# Patient Record
Sex: FEMALE | Race: WHITE | NOT HISPANIC OR LATINO | Employment: FULL TIME | URBAN - METROPOLITAN AREA
[De-identification: names, ages, dates, MRNs, and addresses within clinical notes are randomized per-mention and may not be internally consistent; named-entity substitution may affect disease eponyms.]

---

## 2018-02-26 ENCOUNTER — HOSPITAL ENCOUNTER (EMERGENCY)
Facility: HOSPITAL | Age: 28
Discharge: HOME | End: 2018-02-26
Attending: EMERGENCY MEDICINE
Payer: MEDICAID

## 2018-02-26 VITALS
WEIGHT: 182.63 LBS | HEART RATE: 80 BPM | BODY MASS INDEX: 27.05 KG/M2 | SYSTOLIC BLOOD PRESSURE: 124 MMHG | OXYGEN SATURATION: 98 % | TEMPERATURE: 98 F | DIASTOLIC BLOOD PRESSURE: 78 MMHG | HEIGHT: 69 IN | RESPIRATION RATE: 16 BRPM

## 2018-02-26 DIAGNOSIS — S96.912A STRAIN OF LEFT ANKLE, INITIAL ENCOUNTER: Primary | ICD-10-CM

## 2018-02-26 DIAGNOSIS — M79.672 LEFT FOOT PAIN: ICD-10-CM

## 2018-02-26 DIAGNOSIS — S99.929A FOOT INJURY: ICD-10-CM

## 2018-02-26 PROCEDURE — 99283 EMERGENCY DEPT VISIT LOW MDM: CPT

## 2018-02-26 PROCEDURE — 25000003 PHARM REV CODE 250: Performed by: EMERGENCY MEDICINE

## 2018-02-26 RX ORDER — NAPROXEN 500 MG/1
500 TABLET ORAL
Status: COMPLETED | OUTPATIENT
Start: 2018-02-26 | End: 2018-02-26

## 2018-02-26 RX ORDER — NAPROXEN 500 MG/1
500 TABLET ORAL 2 TIMES DAILY WITH MEALS
Qty: 20 TABLET | Refills: 0 | Status: SHIPPED | OUTPATIENT
Start: 2018-02-26 | End: 2020-01-04

## 2018-02-26 RX ADMIN — NAPROXEN 500 MG: 500 TABLET ORAL at 09:02

## 2018-02-27 NOTE — ED PROVIDER NOTES
"   History      Chief Complaint   Patient presents with    Foot Injury     Patient reports left foot pain post horseplaying.       Review of patient's allergies indicates:  No Known Allergies     HPI   HPI    2/26/2018, 9:20 PM   History obtained from the patient      History of Present Illness: Zuleyka Pop is a 27 y.o. female patient who presents to the Emergency Department for L foot pain which onset suddenly while "wrestling" tonight. Symptoms are constant and moderate in severity. No mitigating or exacerbating factors reported. Associated sxs include pain with walking. Patient denies any other injuries, and all other sxs at this time. Prior Tx includes nothing. No further complaints or concerns at this time.         Arrival mode: Personal vehicle      PCP: Primary Doctor No       Past Medical History:  History reviewed. No pertinent past medical history.    Past Surgical History:  Past Surgical History:   Procedure Laterality Date    TUBAL LIGATION           Family History:  History reviewed. No pertinent family history.    Social History:  Social History     Social History Main Topics    Smoking status: Current Every Day Smoker     Packs/day: 1.00     Types: Cigarettes    Smokeless tobacco: Never Used    Alcohol use No    Drug use: No    Sexual activity: Yes     Partners: Male       ROS   Review of Systems   Constitutional: Negative for fever.   HENT: Negative for sore throat.    Respiratory: Negative for shortness of breath.    Cardiovascular: Negative for chest pain.   Gastrointestinal: Negative for nausea.   Genitourinary: Negative for dysuria.   Musculoskeletal: Positive for arthralgias and gait problem. Negative for back pain.   Skin: Negative for rash.   Neurological: Negative for weakness.   Hematological: Does not bruise/bleed easily.   All other systems reviewed and are negative.      Physical Exam      Initial Vitals [02/26/18 2108]   BP Pulse Resp Temp SpO2   133/71 80 18 98 °F (36.7 °C) " "100 %      MAP       91.67          Physical Exam  Nursing Notes and Vital Signs Reviewed.  Constitutional: Patient is in no acute distress. Well-developed and well-nourished.  Head: Atraumatic. Normocephalic.  Eyes: PERRL. EOM intact. Conjunctivae are not pale. No scleral icterus.  ENT: Mucous membranes are moist. Oropharynx is clear and symmetric.    Neck: Supple. Full ROM. No lymphadenopathy.  Cardiovascular: Regular rate. Regular rhythm. No murmurs, rubs, or gallops. Distal pulses are 2+ and symmetric.  Pulmonary/Chest: No respiratory distress. Clear to auscultation bilaterally. No wheezing or rales.  Abdominal: Soft and non-distended.  There is no tenderness.  No rebound, guarding, or rigidity. Good bowel sounds.  Genitourinary: No CVA tenderness  Musculoskeletal: Moves all extremities. No obvious deformities. No edema. No calf tenderness. Tenderness to dorsum of L midfoot, no obvious swelling noted   Skin: Warm and dry.  Neurological:  Alert, awake, and appropriate.  Normal speech.  No acute focal neurological deficits are appreciated.  Psychiatric: Normal affect. Good eye contact. Appropriate in content.    ED Course    Procedures  ED Vital Signs:  Vitals:    02/26/18 2108 02/26/18 2214   BP: 133/71 124/78   Pulse: 80 80   Resp: 18 16   Temp: 98 °F (36.7 °C) 98 °F (36.7 °C)   TempSrc: Oral Oral   SpO2: 100% 98%   Weight: 82.8 kg (182 lb 9.6 oz)    Height: 5' 9" (1.753 m)        Abnormal Lab Results:  Labs Reviewed - No data to display     All Lab Results:      Imaging Results:  Imaging Results          X-Ray Ankle Complete Left (Final result)  Result time 02/26/18 21:57:58    Final result by Laurel Mario MD (02/26/18 21:57:58)                 Impression:      Normal exam.      Electronically signed by: LAUREL MARIO MD  Date:     02/26/18  Time:    21:57              Narrative:    EXAM: XR ANKLE COMPLETE 3 VIEW LEFT    CLINICAL HISTORY:  Left ankle pain    COMPARISON EXAMS: none    FINDINGS:  Negative for " "fracture or dislocation.  No significant arthritic changes.  No bony abnormalities.                             X-Ray Foot Complete Left (Final result)  Result time 02/26/18 21:57:21    Final result by Laurel Mario MD (02/26/18 21:57:21)                 Impression:      No acute findings.      Electronically signed by: LAUREL MARIO MD  Date:     02/26/18  Time:    21:57              Narrative:    EXAM: XR FOOT COMPLETE 3 VIEW LEFT    CLINICAL HISTORY:  S99.929A Unspecified injury of unspecified foot, initial encounter;    COMPARISON EXAMS: none    FINDINGS:  Negative for fracture or dislocation.  No significant arthritic changes.  Small sclerotic focus in the cuboid statistically a small bone island.                               Vitals:    02/26/18 2108 02/26/18 2214   BP: 133/71 124/78   Pulse: 80 80   Resp: 18 16   Temp: 98 °F (36.7 °C) 98 °F (36.7 °C)   TempSrc: Oral Oral   SpO2: 100% 98%   Weight: 82.8 kg (182 lb 9.6 oz)    Height: 5' 9" (1.753 m)        No results found for this or any previous visit.      Imaging Results          X-Ray Ankle Complete Left (Final result)  Result time 02/26/18 21:57:58    Final result by Laurel Mario MD (02/26/18 21:57:58)                 Impression:      Normal exam.      Electronically signed by: LAUREL MARIO MD  Date:     02/26/18  Time:    21:57              Narrative:    EXAM: XR ANKLE COMPLETE 3 VIEW LEFT    CLINICAL HISTORY:  Left ankle pain    COMPARISON EXAMS: none    FINDINGS:  Negative for fracture or dislocation.  No significant arthritic changes.  No bony abnormalities.                             X-Ray Foot Complete Left (Final result)  Result time 02/26/18 21:57:21    Final result by Laurel Mario MD (02/26/18 21:57:21)                 Impression:      No acute findings.      Electronically signed by: LAUREL MARIO MD  Date:     02/26/18  Time:    21:57              Narrative:    EXAM: XR FOOT COMPLETE 3 VIEW LEFT    CLINICAL HISTORY:  S99.929A Unspecified " injury of unspecified foot, initial encounter;    COMPARISON EXAMS: none    FINDINGS:  Negative for fracture or dislocation.  No significant arthritic changes.  Small sclerotic focus in the cuboid statistically a small bone island.                              Medications   naproxen tablet 500 mg (500 mg Oral Given 2/26/18 2144)     9:30 PM - Transfer of Care: Patient care transferred from Remy Hammer to , pending xray results.      9:36 PM  - Re-evaluation:  The patient is resting comfortably and is in no acute distress.  Agree with Remy Hammer's assessment.  Pt's states her pain is in proximal metatarsal/ATFL area.  Skin intact Discussed test results and notified of pending labs. Answered questions at this time.     10:07 PM - Re-evaluation: The patient is resting comfortably and is in no acute distress. She states that her symptoms have improved after treatment within ER. Discussed test results, shared treatment plan, specific conditions for return, and importance of follow up with patient and family.  She understands and agrees with the plan as discussed. Answered  her questions at this time. She has remained hemodynamically stable throughout the ED course and is appropriate for discharge home.     Regarding ANKLE PAIN, for treatment, patient instructed to: rest ankle for several days without applying weight on ankle; use an ACE bandage or ankle brace; consider using crutches or a cane to help take the weight off a sore or unsteady ankle; keep foot/ankle elevated; apply ice to area immediately and for 10-15 minutes every hour for the first day, then, apply ice every 3-4 hours for 2 more days; and try over-the-counter Tylenol or Ibuprofen for pain and swelling. Patient advised to notify primary care provider or return to ED if swelling does not decrease within 2-3 days or notice signs or symptoms of infection (redness, increased pain, fever, and warmth around ankle); or if they notice a popping  sound and have immediate trouble using or moving ankle. For prevention, patient advised to obtain/maintain healthy weight; warm up before exercising; avoid sports and activities in which proper conditioning has not been achieved; ensure that shoes fit properly; use ankle support braces, work on balance, and do agility exercises.    Regarding CONTUSIONS/STRAINS, I advised patient to: REST the injured area or use it less than usual; apply ICE to decrease swelling and pain and help prevent tissue damage; use COMPRESSION with an elastic bandage to support the area and decrease swelling; ELEVATE injured body part above the level of the heart to help decrease pain and swelling; AVOID using massage or massage to acute injuries as it may slow healing of the area; AVOID drinking alcohol as it may slow healing of the injury; and avoid stretching injured muscles. Advised patient to return to the emergency department or contact primary care provider if: having trouble moving injured area; notice tingling or numbness in or near the injured area; extremity below the bruise gets cold or turns pale; a new lump develops in the injured area; symptoms do not improve with treatment after 4 to 5 days; there is any questions or concerns about the condition or treatment plan.     Pre-hypertension/Hypertension: The pt has been informed that they may have pre-hypertension or hypertension based on a blood pressure reading in the ED. I recommend that the pt call the PCP listed on their discharge instructions or a physician of their choice this week to arrange f/u for further evaluation of possible pre-hypertension or hypertension.     Zuleyka Pop was given a handout which discussed their disease process, precautions, and instructions for follow-up and therapy.    Follow-up Information     [facility]. Schedule an appointment as soon as possible for a visit in 1 week.    Contact information:  [address] [facility]  [place]  [place] [address]  [phone]             [facility] Medicine. Schedule an appointment as soon as possible for a visit in 1 week.    Specialty:  Internal Medicine  Contact information:  [address] [id number] [phone]           [facility].    Specialty:  Emergency Medicine  Why:  As needed, If symptoms worsen  Contact information:  [address] [id number] [phone]                 Discharge Medication List as of 2/26/2018 10:05 PM      START taking these medications    Details   naproxen (NAPROSYN) 500 MG tablet Take 1 tablet (500 mg total) by mouth 2 (two) times daily with meals., Starting Mon 2/26/2018, Print                ED Diagnosis  1. Strain of left ankle, initial encounter    2. Foot injury    3. Left foot pain               The Emergency Provider reviewed the vital signs and test results, which are outlined above.    ED Discussion         ED Medication(s):  Medications   naproxen tablet 500 mg (500 mg Oral Given 2/26/18 2144)       Discharge Medication List as of 2/26/2018 10:05 PM      START taking these medications    Details   naproxen (NAPROSYN) 500 MG tablet Take 1 tablet (500 mg total) by mouth 2 (two) times daily with meals., Starting Mon 2/26/2018, Print             Follow-up Information     [facility]. Schedule an appointment as soon as possible for a visit in 1 week.    Contact information:  [address]  [place] [address]  [phone]             [facility] Medicine. Schedule an appointment as soon as possible for a visit in 1 week.    Specialty:  Internal Medicine  Contact information:  [address] [address] [id number] [phone]           [facility].    Specialty:  Emergency Medicine  Why:  As needed, If symptoms worsen  Contact information:  [address] [address] [id number] [phone]                   Medical Decision  Making                 Clinical Impression       ICD-10-CM ICD-9-CM   1. Strain of left ankle, initial encounter S96.912A 845.00   2. Foot injury S99.929A 959.7   3. Left foot pain M79.672 729.5       Disposition:   Disposition: Discharged  Condition: Stable         Everardo Abad Jr., MD  02/26/18 6663

## 2020-01-04 ENCOUNTER — HOSPITAL ENCOUNTER (EMERGENCY)
Facility: HOSPITAL | Age: 30
Discharge: HOME | End: 2020-01-04
Attending: EMERGENCY MEDICINE
Payer: MEDICAID

## 2020-01-04 VITALS
HEIGHT: 69 IN | RESPIRATION RATE: 19 BRPM | SYSTOLIC BLOOD PRESSURE: 121 MMHG | DIASTOLIC BLOOD PRESSURE: 72 MMHG | WEIGHT: 189.5 LBS | BODY MASS INDEX: 28.07 KG/M2 | OXYGEN SATURATION: 98 % | TEMPERATURE: 98 F | HEART RATE: 93 BPM

## 2020-01-04 DIAGNOSIS — R68.89 FLU-LIKE SYMPTOMS: Primary | ICD-10-CM

## 2020-01-04 DIAGNOSIS — J06.9 VIRAL URI WITH COUGH: ICD-10-CM

## 2020-01-04 LAB
INFLUENZA A, MOLECULAR: NEGATIVE
INFLUENZA B, MOLECULAR: NEGATIVE
SPECIMEN SOURCE: NORMAL

## 2020-01-04 PROCEDURE — 99284 EMERGENCY DEPT VISIT MOD MDM: CPT | Mod: 25,ER

## 2020-01-04 PROCEDURE — 25000003 PHARM REV CODE 250: Mod: ER | Performed by: EMERGENCY MEDICINE

## 2020-01-04 PROCEDURE — 96372 THER/PROPH/DIAG INJ SC/IM: CPT | Mod: ER

## 2020-01-04 PROCEDURE — 63600175 PHARM REV CODE 636 W HCPCS: Mod: ER | Performed by: EMERGENCY MEDICINE

## 2020-01-04 PROCEDURE — 87502 INFLUENZA DNA AMP PROBE: CPT | Mod: ER

## 2020-01-04 RX ORDER — DULOXETIN HYDROCHLORIDE 30 MG/1
30 CAPSULE, DELAYED RELEASE ORAL DAILY
COMMUNITY

## 2020-01-04 RX ORDER — NAPROXEN 500 MG/1
500 TABLET ORAL 2 TIMES DAILY PRN
Qty: 10 TABLET | Refills: 1 | Status: SHIPPED | OUTPATIENT
Start: 2020-01-04 | End: 2020-01-09

## 2020-01-04 RX ORDER — DEXAMETHASONE SODIUM PHOSPHATE 4 MG/ML
8 INJECTION, SOLUTION INTRA-ARTICULAR; INTRALESIONAL; INTRAMUSCULAR; INTRAVENOUS; SOFT TISSUE
Status: COMPLETED | OUTPATIENT
Start: 2020-01-04 | End: 2020-01-04

## 2020-01-04 RX ORDER — NAPROXEN 500 MG/1
500 TABLET ORAL
Status: DISCONTINUED | OUTPATIENT
Start: 2020-01-04 | End: 2020-01-04

## 2020-01-04 RX ORDER — ACETAMINOPHEN 500 MG
1000 TABLET ORAL
Status: COMPLETED | OUTPATIENT
Start: 2020-01-04 | End: 2020-01-04

## 2020-01-04 RX ORDER — NAPROXEN 500 MG/1
500 TABLET ORAL
Status: COMPLETED | OUTPATIENT
Start: 2020-01-04 | End: 2020-01-04

## 2020-01-04 RX ORDER — PROMETHAZINE HYDROCHLORIDE AND CODEINE PHOSPHATE 6.25; 1 MG/5ML; MG/5ML
5 SOLUTION ORAL EVERY 4 HOURS PRN
Qty: 120 ML | Refills: 0 | Status: SHIPPED | OUTPATIENT
Start: 2020-01-04 | End: 2020-01-11

## 2020-01-04 RX ORDER — ACETAMINOPHEN 500 MG
1000 TABLET ORAL
Status: DISCONTINUED | OUTPATIENT
Start: 2020-01-04 | End: 2020-01-04

## 2020-01-04 RX ADMIN — DEXAMETHASONE SODIUM PHOSPHATE 8 MG: 4 INJECTION, SOLUTION INTRA-ARTICULAR; INTRALESIONAL; INTRAMUSCULAR; INTRAVENOUS; SOFT TISSUE at 09:01

## 2020-01-04 RX ADMIN — NAPROXEN 500 MG: 500 TABLET ORAL at 09:01

## 2020-01-04 RX ADMIN — ACETAMINOPHEN 1000 MG: 500 TABLET ORAL at 09:01

## 2020-01-04 NOTE — ED NOTES
All DC instructions given and explained to pt.  Work excuse, written prescriptions and all DC instructions given and explained. Voiced understanding. DC to home.

## 2020-01-04 NOTE — ED PROVIDER NOTES
Encounter Date: 1/4/2020       History     Chief Complaint   Patient presents with    Influenza     1 week of nasal congestion, chest congestion, nonproductive cough, body aches. Temp, highest 102, over past week     Mild flu-like symptoms for about a week, highest temperature reported 102, cough, chest congestion body aches.  Denies chance of pregnancy.  Denies urinary symptoms. No dyspnea.  Trying to quit smoking.  Had to leave work today, will need a note.  No other complaints.    The history is provided by the patient. No  was used.     Review of patient's allergies indicates:  No Known Allergies  History reviewed. No pertinent past medical history.  Past Surgical History:   Procedure Laterality Date    TUBAL LIGATION       History reviewed. No pertinent family history.  Social History     Tobacco Use    Smoking status: Current Every Day Smoker     Packs/day: 1.00     Types: Cigarettes    Smokeless tobacco: Never Used   Substance Use Topics    Alcohol use: No    Drug use: No     Review of Systems   Constitutional: Positive for fever. Negative for activity change and fatigue.   HENT: Positive for congestion. Negative for ear pain, facial swelling, nosebleeds, sinus pressure and sore throat.    Eyes: Negative for pain, discharge, redness and visual disturbance.   Respiratory: Positive for cough. Negative for choking, chest tightness, shortness of breath and wheezing.    Cardiovascular: Negative for chest pain, palpitations and leg swelling.   Gastrointestinal: Negative for abdominal distention, abdominal pain, nausea and vomiting.   Endocrine: Negative for heat intolerance, polydipsia and polyuria.   Genitourinary: Negative for difficulty urinating, dysuria, flank pain, hematuria and urgency.   Musculoskeletal: Positive for arthralgias and myalgias. Negative for back pain, gait problem and joint swelling.   Skin: Negative for color change and rash.   Allergic/Immunologic: Negative for  environmental allergies and food allergies.   Neurological: Negative for dizziness, weakness, numbness and headaches.   Hematological: Negative for adenopathy. Does not bruise/bleed easily.   Psychiatric/Behavioral: Negative for agitation and behavioral problems. The patient is not nervous/anxious.    All other systems reviewed and are negative.      Physical Exam     Initial Vitals [01/04/20 0847]   BP Pulse Resp Temp SpO2   121/72 93 19 98.3 °F (36.8 °C) 98 %      MAP       --         Physical Exam    Nursing note and vitals reviewed.  Constitutional: She appears well-developed and well-nourished. She is not diaphoretic. She appears distressed.   Mildly ill/ congested   HENT:   Head: Normocephalic and atraumatic.   Mouth/Throat: No oropharyngeal exudate.   Mildly congested; mild bilateral frontal/ maxillary sinus percussion tenderness   Eyes: Conjunctivae and EOM are normal. Pupils are equal, round, and reactive to light. Right eye exhibits no discharge. Left eye exhibits no discharge. No scleral icterus.   Neck: Normal range of motion. Neck supple. No thyromegaly present. No tracheal deviation present. No JVD present.   Cardiovascular: Normal rate, regular rhythm, normal heart sounds and intact distal pulses. Exam reveals no gallop and no friction rub.    No murmur heard.  Pulmonary/Chest: Breath sounds normal. No stridor. No respiratory distress. She has no wheezes. She has no rhonchi. She has no rales. She exhibits no tenderness.   Abdominal: Soft. Bowel sounds are normal. She exhibits no distension and no mass. There is no tenderness. There is no rebound and no guarding.   Musculoskeletal: Normal range of motion. She exhibits no edema or tenderness.   Neurological: She is alert and oriented to person, place, and time. She has normal strength.   Skin: Skin is warm and dry. No rash and no abscess noted. No erythema.   Psychiatric: She has a normal mood and affect. Her behavior is normal. Judgment and thought  content normal.         ED Course   Procedures  Labs Reviewed   INFLUENZA A & B BY MOLECULAR          Imaging Results    None                                          Clinical Impression:     1. Flu-like symptoms    2. Viral URI with cough         Disposition:   Disposition: Discharged  Condition: Stable                     Delbert Ahmadi MD  01/04/20 0906

## 2021-04-29 ENCOUNTER — PATIENT MESSAGE (OUTPATIENT)
Dept: RESEARCH | Facility: HOSPITAL | Age: 31
End: 2021-04-29

## 2024-02-15 ENCOUNTER — HOSPITAL ENCOUNTER (EMERGENCY)
Facility: HOSPITAL | Age: 34
Discharge: HOME | End: 2024-02-15
Attending: EMERGENCY MEDICINE
Payer: COMMERCIAL

## 2024-02-15 VITALS
HEART RATE: 82 BPM | TEMPERATURE: 98 F | BODY MASS INDEX: 31.84 KG/M2 | WEIGHT: 215.63 LBS | DIASTOLIC BLOOD PRESSURE: 84 MMHG | RESPIRATION RATE: 18 BRPM | OXYGEN SATURATION: 97 % | SYSTOLIC BLOOD PRESSURE: 126 MMHG

## 2024-02-15 DIAGNOSIS — H66.93 BILATERAL OTITIS MEDIA, UNSPECIFIED OTITIS MEDIA TYPE: Primary | ICD-10-CM

## 2024-02-15 PROCEDURE — 99283 EMERGENCY DEPT VISIT LOW MDM: CPT | Mod: ER

## 2024-02-15 RX ORDER — AMOXICILLIN AND CLAVULANATE POTASSIUM 875; 125 MG/1; MG/1
1 TABLET, FILM COATED ORAL 2 TIMES DAILY
Qty: 14 TABLET | Refills: 0 | Status: SHIPPED | OUTPATIENT
Start: 2024-02-15

## 2024-02-15 NOTE — ED PROVIDER NOTES
Encounter Date: 2/15/2024       History     Chief Complaint   Patient presents with    Otalgia     + sore throat for about 3 days      The history is provided by the patient.   Otalgia  This is a new problem. The current episode started several days ago. There is pain in both ears. The problem occurs constantly. The problem has been unchanged. The pain is at a severity of 3/10. Associated symptoms include sore throat and cough. Pertinent negatives include no ear discharge, no headaches, no hearing loss, no rhinorrhea, no abdominal pain, no diarrhea, no vomiting, no neck pain and no rash.     Review of patient's allergies indicates:  No Known Allergies  History reviewed. No pertinent past medical history.  Past Surgical History:   Procedure Laterality Date    TUBAL LIGATION       No family history on file.  Social History     Tobacco Use    Smoking status: Every Day     Current packs/day: 1.00     Types: Cigarettes    Smokeless tobacco: Never   Substance Use Topics    Alcohol use: No    Drug use: No     Review of Systems   Constitutional:  Negative for fever.   HENT:  Positive for ear pain and sore throat. Negative for ear discharge, hearing loss and rhinorrhea.    Respiratory:  Positive for cough. Negative for shortness of breath.    Cardiovascular:  Negative for chest pain.   Gastrointestinal:  Negative for abdominal pain, diarrhea, nausea and vomiting.   Genitourinary:  Negative for dysuria.   Musculoskeletal:  Negative for back pain and neck pain.   Skin:  Negative for rash.   Neurological:  Negative for weakness and headaches.   Hematological:  Does not bruise/bleed easily.       Physical Exam     Initial Vitals [02/15/24 0647]   BP Pulse Resp Temp SpO2   126/84 82 18 98.3 °F (36.8 °C) 97 %      MAP       --         Physical Exam    Nursing note and vitals reviewed.  Constitutional: She appears well-developed and well-nourished. No distress.   HENT:   Head: Normocephalic and atraumatic.   Right Ear: Tympanic  membrane is erythematous.   Left Ear: Tympanic membrane is erythematous.   Mouth/Throat: Posterior oropharyngeal erythema present. No oropharyngeal exudate.   Eyes: Conjunctivae and EOM are normal. Pupils are equal, round, and reactive to light.   Neck: Neck supple.   Normal range of motion.  Cardiovascular:  Normal rate, regular rhythm and normal heart sounds.           Pulmonary/Chest: Breath sounds normal. No respiratory distress.   Abdominal: Abdomen is soft. Bowel sounds are normal. She exhibits no distension. There is no abdominal tenderness.   Musculoskeletal:         General: Normal range of motion.      Cervical back: Normal range of motion and neck supple.     Neurological: She is alert and oriented to person, place, and time. She has normal strength.   Skin: Skin is warm and dry.   Psychiatric: She has a normal mood and affect. Thought content normal.         ED Course   Procedures  Labs Reviewed - No data to display       Imaging Results    None     7:10 AM - Counseling: Spoke with the patient and discussed todays findings, in addition to providing specific details for the plan of care and counseling regarding the diagnosis and prognosis. Questions are answered at this time.        Medications - No data to display  Medical Decision Making  Problems Addressed:  Bilateral otitis media, unspecified otitis media type: acute illness or injury    Risk  Prescription drug management.                                      Clinical Impression:  Final diagnoses:  [H66.93] Bilateral otitis media, unspecified otitis media type (Primary)          ED Disposition Condition    Discharge Stable          ED Prescriptions       Medication Sig Dispense Start Date End Date Auth. Provider    amoxicillin-clavulanate 875-125mg (AUGMENTIN) 875-125 mg per tablet Take 1 tablet by mouth 2 (two) times daily. 14 tablet 2/15/2024 -- Melvin Parrish MD          Follow-up Information       Follow up With Specialties Details Why  Contact Info    PCP  Call in 2 days      [facility] Emergency Medicine  If symptoms worsen [id number] [facility] [address]  [facility] [id number] [phone]             Melvin Parrish MD  02/15/24 0710